# Patient Record
Sex: FEMALE | Race: BLACK OR AFRICAN AMERICAN | NOT HISPANIC OR LATINO | URBAN - METROPOLITAN AREA
[De-identification: names, ages, dates, MRNs, and addresses within clinical notes are randomized per-mention and may not be internally consistent; named-entity substitution may affect disease eponyms.]

---

## 2024-08-27 ENCOUNTER — APPOINTMENT (OUTPATIENT)
Dept: DERMATOLOGY | Facility: CLINIC | Age: 39
End: 2024-08-27
Payer: COMMERCIAL

## 2024-11-15 ENCOUNTER — APPOINTMENT (OUTPATIENT)
Dept: RADIOLOGY | Facility: HOSPITAL | Age: 39
End: 2024-11-15
Payer: COMMERCIAL

## 2024-11-15 ENCOUNTER — HOSPITAL ENCOUNTER (EMERGENCY)
Facility: HOSPITAL | Age: 39
Discharge: HOME | End: 2024-11-15
Payer: COMMERCIAL

## 2024-11-15 VITALS
OXYGEN SATURATION: 97 % | SYSTOLIC BLOOD PRESSURE: 138 MMHG | RESPIRATION RATE: 15 BRPM | DIASTOLIC BLOOD PRESSURE: 79 MMHG | HEART RATE: 83 BPM | WEIGHT: 180 LBS | BODY MASS INDEX: 29.99 KG/M2 | HEIGHT: 65 IN | TEMPERATURE: 97.6 F

## 2024-11-15 DIAGNOSIS — S92.354A NONDISPLACED FRACTURE OF FIFTH METATARSAL BONE, RIGHT FOOT, INITIAL ENCOUNTER FOR CLOSED FRACTURE: Primary | ICD-10-CM

## 2024-11-15 PROCEDURE — 99284 EMERGENCY DEPT VISIT MOD MDM: CPT | Performed by: NURSE PRACTITIONER

## 2024-11-15 PROCEDURE — 2500000001 HC RX 250 WO HCPCS SELF ADMINISTERED DRUGS (ALT 637 FOR MEDICARE OP): Mod: SE | Performed by: NURSE PRACTITIONER

## 2024-11-15 PROCEDURE — 73610 X-RAY EXAM OF ANKLE: CPT | Mod: RT

## 2024-11-15 PROCEDURE — 73630 X-RAY EXAM OF FOOT: CPT | Mod: RIGHT SIDE | Performed by: RADIOLOGY

## 2024-11-15 PROCEDURE — 99284 EMERGENCY DEPT VISIT MOD MDM: CPT | Mod: 25 | Performed by: NURSE PRACTITIONER

## 2024-11-15 PROCEDURE — 29505 APPLICATION LONG LEG SPLINT: CPT | Performed by: NURSE PRACTITIONER

## 2024-11-15 PROCEDURE — 73610 X-RAY EXAM OF ANKLE: CPT | Mod: RIGHT SIDE | Performed by: RADIOLOGY

## 2024-11-15 PROCEDURE — 73630 X-RAY EXAM OF FOOT: CPT | Mod: RT

## 2024-11-15 RX ORDER — IBUPROFEN 600 MG/1
600 TABLET ORAL ONCE
Status: COMPLETED | OUTPATIENT
Start: 2024-11-15 | End: 2024-11-15

## 2024-11-15 RX ORDER — IBUPROFEN 600 MG/1
600 TABLET ORAL EVERY 6 HOURS PRN
Qty: 15 TABLET | Refills: 0 | Status: SHIPPED | OUTPATIENT
Start: 2024-11-15 | End: 2024-11-22

## 2024-11-15 RX ORDER — ACETAMINOPHEN 325 MG/1
650 TABLET ORAL EVERY 6 HOURS PRN
Qty: 30 TABLET | Refills: 0 | Status: SHIPPED | OUTPATIENT
Start: 2024-11-15

## 2024-11-15 ASSESSMENT — COLUMBIA-SUICIDE SEVERITY RATING SCALE - C-SSRS
2. HAVE YOU ACTUALLY HAD ANY THOUGHTS OF KILLING YOURSELF?: NO
1. IN THE PAST MONTH, HAVE YOU WISHED YOU WERE DEAD OR WISHED YOU COULD GO TO SLEEP AND NOT WAKE UP?: NO
6. HAVE YOU EVER DONE ANYTHING, STARTED TO DO ANYTHING, OR PREPARED TO DO ANYTHING TO END YOUR LIFE?: NO

## 2024-11-15 ASSESSMENT — PAIN DESCRIPTION - PAIN TYPE: TYPE: ACUTE PAIN

## 2024-11-15 ASSESSMENT — LIFESTYLE VARIABLES
TOTAL SCORE: 0
HAVE PEOPLE ANNOYED YOU BY CRITICIZING YOUR DRINKING: NO
EVER FELT BAD OR GUILTY ABOUT YOUR DRINKING: NO
EVER HAD A DRINK FIRST THING IN THE MORNING TO STEADY YOUR NERVES TO GET RID OF A HANGOVER: NO
HAVE YOU EVER FELT YOU SHOULD CUT DOWN ON YOUR DRINKING: NO

## 2024-11-15 ASSESSMENT — PAIN DESCRIPTION - ORIENTATION: ORIENTATION: RIGHT

## 2024-11-15 ASSESSMENT — PAIN - FUNCTIONAL ASSESSMENT: PAIN_FUNCTIONAL_ASSESSMENT: 0-10

## 2024-11-15 ASSESSMENT — PAIN SCALES - GENERAL
PAINLEVEL_OUTOF10: 5 - MODERATE PAIN
PAINLEVEL_OUTOF10: 0 - NO PAIN

## 2024-11-15 ASSESSMENT — PAIN DESCRIPTION - LOCATION: LOCATION: ANKLE

## 2024-11-15 ASSESSMENT — PAIN DESCRIPTION - DESCRIPTORS: DESCRIPTORS: DISCOMFORT

## 2024-11-15 NOTE — ED PROCEDURE NOTE
Procedure  Splint Application    Performed by: EUGENE Finley  Authorized by: EUGENE Finley    Consent:     Consent obtained:  Verbal    Consent given by:  Patient    Risks, benefits, and alternatives were discussed: yes      Risks discussed:  Pain    Alternatives discussed:  No treatment  Universal protocol:     Procedure explained and questions answered to patient or proxy's satisfaction: yes      Relevant documents present and verified: yes      Test results available: yes      Imaging studies available: yes      Site/side marked: yes      Immediately prior to procedure a time out was called: yes      Patient identity confirmed:  Verbally with patient and hospital-assigned identification number  Pre-procedure details:     Distal neurologic exam:  Normal    Distal perfusion: distal pulses strong and brisk capillary refill    Procedure details:     Location:  Foot    Foot location:  R foot    Cast type:  Long leg    Splint type:  Long leg    Supplies:  Plaster    Attestation: Splint applied and adjusted personally by me    Post-procedure details:     Distal neurologic exam:  Normal    Distal perfusion: distal pulses strong and brisk capillary refill      Procedure completion:  Tolerated well, no immediate complications             EUGENE Finley  11/15/24 1048

## 2024-11-15 NOTE — ED PROVIDER NOTES
Emergency Department Encounter  [facility] EMERGENCY MEDICINE    Patient: Colette Mena  MRN: [id number]  : 1985  Date of Evaluation: 11/15/2024  ED Provider: EUGENE Finley      Chief Complaint       Chief Complaint   Patient presents with    Ankle Pain        Limitations to History: none  Historian: patient  Records reviewed: EMR inpatient and outpatient notes, Care Everywhere    This is a 39-year-old female without any significant PMH who presents to the emergency room with right ankle and foot pain.  Patient states that she slipped and fell yesterday.  Patient states that she was walking outside on the concrete when this happened.  Denies any head injury or LOC.  Denies any other injuries.  Patient states that she has right foot and right ankle pain.  Patient describes her pain as a pressure, constant pain rating it a 10 out of 10.  Patient has been able to ambulate after the fall.  Denies taking any over-the-counter medications prior to arrival.    PMH: Denies  PSH: Denies  Allergies: NKDA  Social HX: + smoker, denies alcohol or drug use.  Family HX: No family history pertinent to current presenting problem  Medications: Reviewed per EMR    ROS:     Review of Systems   Musculoskeletal:         + Foot and ankle pain     14 systems reviewed and otherwise acutely negative except as in the [place].        Past History     Past Medical History:   Diagnosis Date    Other conditions influencing health status     H/O pregnancy    Personal history of other endocrine, nutritional and metabolic disease     History of obesity    Personal history of other mental and behavioral disorders     History of depression    Personal history of other specified conditions     History of nausea    Personal history of other specified conditions     History of vomiting    Type A blood, Rh positive     Blood type A+     Past Surgical History:   Procedure Laterality Date    TUBAL LIGATION  2014     Tubal Ligation         Medications/Allergies     Previous Medications    No medications on file     No Known Allergies     Physical Exam       ED Triage Vitals [11/15/24 0827]   Temperature Heart Rate Respirations BP   36.4 °C (97.6 °F) 90 16 144/89      Pulse Ox Temp Source Heart Rate Source Patient Position   98 % Oral Monitor Sitting      BP Location FiO2 (%)     Left arm --       Physical Exam:    Appearance: Alert, oriented , cooperative,  in no acute distress.     Skin: Intact,  dry skin, no lesions, rash, petechiae or purpura.     Eyes: PERRLA, EOMs intact.    ENT: Hearing grossly intact. External auditory canals patent, tympanic membranes intact with visible landmarks. Nares patent, mucus membranes moist. Dentition without lesions. Pharynx clear, uvula midline.     Neck: Supple, without meningismus.     Pulmonary: Clear bilaterally with good chest wall excursion. No rales, rhonchi or wheezing. No accessory muscle use or stridor.    Cardiac: Normal S1, S2 without murmur, rub, gallop or extrasystole. No JVD, Carotids without bruits.    Abdomen: Soft, nontender, active bowel sounds.  No palpable organomegaly.  No rebound or guarding.  No CVA tenderness.    Musculoskeletal: Limited ROM of the right ankle. Mild swelling to the lateral right foot and ankle. Pulses intact. Tenderness to the right lateral ankle and foot.    Neurological:  Normal sensation, no weakness, no focal findings identified.    Psychiatric: Appropriate mood and affect.       Diagnostics   Labs:  No results found for this or any previous visit (from the past 24 hours).   Radiographs:  XR ankle right 3+ views   Final Result   Nondisplaced fracture through the base of the fifth metatarsal.   Signed by Jorge Herzog MD      XR foot right 3+ views   Final Result   Nondisplaced fracture through the base of the fifth metatarsal.   Signed by Jorge Herzog MD                Assessment   In brief, Colette Mena is a 39 y.o. female who presented  "to the emergency department with right foot pain.          ED Course/[facility]     Diagnoses as of 11/15/24 1045   Nondisplaced fracture of fifth metatarsal bone, right foot, initial encounter for closed fracture      Visit Vitals  /89 (BP Location: Left arm, Patient Position: Sitting)   Pulse 90   Temp 36.4 °C (97.6 °F) (Oral)   Resp 16   Ht 1.651 m (5' 5\")   Wt 81.6 kg (180 lb)   SpO2 98%   BMI 29.95 kg/m²   BSA 1.93 m²       Medications   ibuprofen tablet 600 mg (600 mg oral Given 11/15/24 0851)       Patient remained stable while in the emergency department. Previous outpatient and ED records were reviewed. Outside records were reviewed.  Differentials include fracture, sprain, strain.  X-ray of the right foot and ankle was obtained.  Nondisplaced fracture through the base of the fifth metatarsal.  Posterior leg splint was applied.  Please see procedure note for complete details.  Patient received ibuprofen 600 mg p.o. once for pain.  Patient denies any chance of pregnancy and states that she is on Depo.  Patient was advised to follow-up with Ortho and was provided with crutches.  Patient was discharged home, advised to follow-up with orthopedics and return the emergency room with worsening symptoms.    Final Impression      1. Nondisplaced fracture of fifth metatarsal bone, right foot, initial encounter for closed fracture          DISPOSITION  Disposition: Discharged home    Comment: Please note this report has been produced using speech recognition software and may contain errors related to that system including errors in grammar, punctuation, and spelling, as well as words and phrases that may be inappropriate.  If there are any questions or concerns please feel free to contact the dictating provider for clarification.    JOHAN Finley-JOHAN Hood-SISSY  11/15/24 1046    "

## 2024-11-15 NOTE — LETTER
November 15, 2024    Patient: Colette Mena   YOB: 1985   Date of Visit: 11/15/2024       To Whom It May Concern:    Colette Mena was seen and treated in our emergency department on 11/15/2024. She  may return to work on 11/22/24  .    If you have any questions or concerns, please don't hesitate to call.              CC: No Recipients

## 2024-11-19 ENCOUNTER — APPOINTMENT (OUTPATIENT)
Dept: ORTHOPEDIC SURGERY | Facility: CLINIC | Age: 39
End: 2024-11-19
Payer: COMMERCIAL

## 2024-11-21 NOTE — PROGRESS NOTES
Subjective      Chief Complaint   Patient presents with    Right Foot - Pain        No surgery found     HPI  This 39 year old patient presents for evaluation of right foot and ankle pain. She states that she was walking outdoors on 11/14/24 and slipped and fell on the cement. She was evaluated in the emergency room at  [facility] the next day and had xrays which were positive for right 5th metatarsal fracture. She currently rates her right foot pain at _3/10 and states it is worse and aggravated by prolonged standing, walking, stair climbing. She has been taking OTC Tylenol alternating with OTC NSAIDS with no relief of pain.     CARDIOLOGY:   Negative for chest pain, shortness of breath.   RESPIRATORY:   Negative for chest pain, shortness of breath.   MUSCULOSKELETAL:   See HPI for details.   NEUROLOGY:   Negative for tingling, numbness, weakness.    Objective    There were no vitals filed for this visit.    Physical Exam  GENERAL:          General Appearance:  This is a pleasant patient with appropriate affect, in no acute distress.   DERMATOLOGY:          Skin: skin at the neck, upper and lower back, and trunk is intact. There is no evidence of skin rash, skin breakdown or ulceration, or atrophic skin change.   EXTREMITIES:          Vascular:  Right, left hands and feet are warm with good color and pulses. Right and left calf and thigh are nontender and nonswollen.   NEUROLOGICAL:          Orientation:  Patient is alert and oriented to person, place, time and situation. Right and left upper and lower extremity motor and sensory examinations are intact.      MUSCULOSKELETAL: Neck: Nontender. No pain with range of motion.  Right foot: There is diffuse tenderness over the base of the right fifth metatarsal.  No pain or limitation with range of motion of the right ankle.  Nontender at the right ankle over the medial or lateral malleolus.  Caldwell's is negative and equal bilaterally.  Compartments soft.   Nontender in the right calf.  Neurovascular is intact to light touch.  Distal pulses are palpable.    XR ankle right 3+ views    Result Date: 11/15/2024  STUDY: Right foot and ankle radiographs; 11/15/2024 9:10 AM INDICATION: Right foot and ankle pain. COMPARISON: None. ACCESSION NUMBER(S): [id number], [id number] ORDERING CLINICIAN: Loretta Culp TECHNIQUE:  Three views of the right foot and three views of the right ankle. FINDINGS:  Right Foot:  Nondisplaced fracture through the base of the fifth metatarsal..  The alignment is anatomic.  Mild lateral soft tissue swelling.. Right Ankle:  There is no displaced fracture.  The alignment is anatomic.  No soft tissue abnormality is seen.    Nondisplaced fracture through the base of the fifth metatarsal. Signed by Jorge Herzog MD    XR foot right 3+ views    Result Date: 11/15/2024  STUDY: Right foot and ankle radiographs; 11/15/2024 9:10 AM INDICATION: Right foot and ankle pain. COMPARISON: None. ACCESSION NUMBER(S): [id number], [id number] ORDERING CLINICIAN: Loretta Culp TECHNIQUE:  Three views of the right foot and three views of the right ankle. FINDINGS:  Right Foot:  Nondisplaced fracture through the base of the fifth metatarsal..  The alignment is anatomic.  Mild lateral soft tissue swelling.. Right Ankle:  There is no displaced fracture.  The alignment is anatomic.  No soft tissue abnormality is seen.    Nondisplaced fracture through the base of the fifth metatarsal. Signed by Jorge Herzog MD       Colette was seen today for pain.  Diagnoses and all orders for this visit:  Right foot pain (Primary)  -     Walking boot  Fracture of base of fifth metatarsal bone of right foot at metaphyseal-diaphyseal junction with routine healing, subsequent encounter  -     Walking boot  Nondisplaced fracture of fifth metatarsal bone, right foot, initial encounter for closed fracture  -     Referral to Orthopaedic Surgery  -     Walking boot   Options are discussed  with the patient in detail. An initial attempted at nonoperative treatment with application of a right lower extremity walking boot with indications, alternatives, potential risks, benefits, unforeseen risks, the rehab involved and the fact that no guarantee can be made were all discussed with the patient in detail. The patient understands, accepts and wishes to proceed. I agree. This is done in the office today.  The patient is instructed regarding activity modification and risk for further injury with falling or trauma, ice, physician directed at home gentle strengthening and ROM exercises, and the appropriate use of Tylenol as needed for pain with its potential adverse reactions and side effects. The patient understands. ,  I estimate that this patient is able to return to work on January 6 and she is given it a note regarding this in office today.  Return in 4 weeks for ricardo-ray or sooner as needed.  Please note that this report has been produced using speech recognition software.  It may contain errors related to grammar, punctuation or spelling.  Electronically signed, but not reviewed.    Kayleigh Tadeo PA-C

## 2024-11-22 ENCOUNTER — TREATMENT (OUTPATIENT)
Dept: PHYSICAL THERAPY | Facility: CLINIC | Age: 39
End: 2024-11-22
Payer: COMMERCIAL

## 2024-11-22 ENCOUNTER — OFFICE VISIT (OUTPATIENT)
Dept: ORTHOPEDIC SURGERY | Facility: CLINIC | Age: 39
End: 2024-11-22
Payer: COMMERCIAL

## 2024-11-22 VITALS — HEIGHT: 63 IN | WEIGHT: 180 LBS | BODY MASS INDEX: 31.89 KG/M2

## 2024-11-22 DIAGNOSIS — S99.191D: ICD-10-CM

## 2024-11-22 DIAGNOSIS — M79.671 RIGHT FOOT PAIN: Primary | ICD-10-CM

## 2024-11-22 DIAGNOSIS — S92.354A NONDISPLACED FRACTURE OF FIFTH METATARSAL BONE, RIGHT FOOT, INITIAL ENCOUNTER FOR CLOSED FRACTURE: ICD-10-CM

## 2024-11-22 PROCEDURE — 28470 CLTX METATARSAL FX WO MNP EA: CPT | Performed by: PHYSICIAN ASSISTANT

## 2024-11-22 PROCEDURE — 97760 ORTHOTIC MGMT&TRAING 1ST ENC: CPT | Mod: GP,CQ

## 2024-11-22 ASSESSMENT — ENCOUNTER SYMPTOMS
OCCASIONAL FEELINGS OF UNSTEADINESS: 0
DEPRESSION: 0
LOSS OF SENSATION IN FEET: 0

## 2024-11-22 ASSESSMENT — LIFESTYLE VARIABLES
HOW OFTEN DO YOU HAVE A DRINK CONTAINING ALCOHOL: NEVER
AUDIT TOTAL SCORE: 0
HOW OFTEN DURING THE LAST YEAR HAVE YOU BEEN UNABLE TO REMEMBER WHAT HAPPENED THE NIGHT BEFORE BECAUSE YOU HAD BEEN DRINKING: NEVER
HAS A RELATIVE, FRIEND, DOCTOR, OR ANOTHER HEALTH PROFESSIONAL EXPRESSED CONCERN ABOUT YOUR DRINKING OR SUGGESTED YOU CUT DOWN: NO
HOW OFTEN DURING THE LAST YEAR HAVE YOU HAD A FEELING OF GUILT OR REMORSE AFTER DRINKING: NEVER
HOW MANY STANDARD DRINKS CONTAINING ALCOHOL DO YOU HAVE ON A TYPICAL DAY: PATIENT DOES NOT DRINK
HOW OFTEN DURING THE LAST YEAR HAVE YOU NEEDED AN ALCOHOLIC DRINK FIRST THING IN THE MORNING TO GET YOURSELF GOING AFTER A NIGHT OF HEAVY DRINKING: NEVER
SKIP TO QUESTIONS 9-10: 1
HOW OFTEN DURING THE LAST YEAR HAVE YOU FOUND THAT YOU WERE NOT ABLE TO STOP DRINKING ONCE YOU HAD STARTED: NEVER
AUDIT-C TOTAL SCORE: 0
HAVE YOU OR SOMEONE ELSE BEEN INJURED AS A RESULT OF YOUR DRINKING: NO
HOW OFTEN DURING THE LAST YEAR HAVE YOU FAILED TO DO WHAT WAS NORMALLY EXPECTED FROM YOU BECAUSE OF DRINKING: NEVER
HOW OFTEN DO YOU HAVE SIX OR MORE DRINKS ON ONE OCCASION: NEVER

## 2024-11-22 ASSESSMENT — PATIENT HEALTH QUESTIONNAIRE - PHQ9
2. FEELING DOWN, DEPRESSED OR HOPELESS: NOT AT ALL
1. LITTLE INTEREST OR PLEASURE IN DOING THINGS: NOT AT ALL
SUM OF ALL RESPONSES TO PHQ9 QUESTIONS 1 AND 2: 0

## 2024-11-22 ASSESSMENT — COLUMBIA-SUICIDE SEVERITY RATING SCALE - C-SSRS
2. HAVE YOU ACTUALLY HAD ANY THOUGHTS OF KILLING YOURSELF?: NO
6. HAVE YOU EVER DONE ANYTHING, STARTED TO DO ANYTHING, OR PREPARED TO DO ANYTHING TO END YOUR LIFE?: NO
1. IN THE PAST MONTH, HAVE YOU WISHED YOU WERE DEAD OR WISHED YOU COULD GO TO SLEEP AND NOT WAKE UP?: NO

## 2024-11-22 ASSESSMENT — PAIN - FUNCTIONAL ASSESSMENT: PAIN_FUNCTIONAL_ASSESSMENT: NO/DENIES PAIN

## 2024-11-22 ASSESSMENT — PAIN SCALES - GENERAL: PAINLEVEL_OUTOF10: 2

## 2024-11-22 NOTE — PROGRESS NOTES
Physical Therapy                 Therapy Communication Note    Patient Name: Colette Mena  MRN: [id number]  Department: [address]  Room: Room/bed info not found  Today's Date: 11/22/2024     Discipline: Physical Therapy    Missed Visit Reason:      Missed Time:     Comment: Pt arrived with an order from Dr. Baptiste for a walking boot for a fifth metatarsal fx. Pt was fit with the boot and was shown how to don and doff  the boot. Pt reported it was a good fit and had no further questions.

## 2024-11-22 NOTE — LETTER
November 22, 2024     Patient: Colette Mena   YOB: 1985   Date of Visit: 11/22/2024       To Whom It May Concern:    It is my medical opinion that Colette Mena may return to work on 1-6-2025 .    If you have any questions or concerns, please don't hesitate to call.         Sincerely,        Kayleigh Tadeo PA-C    CC: No Recipients

## 2024-11-22 NOTE — PATIENT INSTRUCTIONS
Thank you for coming to see us today!     Continue to use tylenol for pain control.   Rest, ice and elevate and wear walking boot  You may bear weight as your pain allows    Follow up  in 4 weeks or sooner as needed      Please fax return to work note, and get [facility] [employer] [facility] paperwork to [phone]

## 2024-12-20 ENCOUNTER — HOSPITAL ENCOUNTER (OUTPATIENT)
Dept: RADIOLOGY | Facility: CLINIC | Age: 39
Discharge: HOME | End: 2024-12-20
Payer: COMMERCIAL

## 2024-12-20 ENCOUNTER — OFFICE VISIT (OUTPATIENT)
Dept: ORTHOPEDIC SURGERY | Facility: CLINIC | Age: 39
End: 2024-12-20
Payer: COMMERCIAL

## 2024-12-20 DIAGNOSIS — S92.354A NONDISPLACED FRACTURE OF FIFTH METATARSAL BONE, RIGHT FOOT, INITIAL ENCOUNTER FOR CLOSED FRACTURE: ICD-10-CM

## 2024-12-20 DIAGNOSIS — T14.8XXA FX: ICD-10-CM

## 2024-12-20 DIAGNOSIS — M79.671 RIGHT FOOT PAIN: Primary | ICD-10-CM

## 2024-12-20 DIAGNOSIS — S99.191D: ICD-10-CM

## 2024-12-20 PROCEDURE — 99211 OFF/OP EST MAY X REQ PHY/QHP: CPT | Performed by: PHYSICIAN ASSISTANT

## 2024-12-20 PROCEDURE — 73630 X-RAY EXAM OF FOOT: CPT | Mod: RT

## 2024-12-20 NOTE — PROGRESS NOTES
Subjective      No chief complaint on file.       No surgery found     HPI  This 39 year old patient presents for re-evaluation of right foot and ankle pain. She states that she was walking outdoors on 11/14/24 and slipped and fell on the cement. She was evaluated in the emergency room at  [facility] the next day and had xrays which were positive for right 5th metatarsal fracture. I previously evaluated her for a right foot 5th metatarsal fracture and treated her with walking boot and activity modification. She currently rates her right foot pain at 3/10 and states it is worse and aggravated by prolonged standing, walking, stair climbing. She is seen today wearing a walking boot. She has been taking OTC Tylenol alternating with OTC NSAIDS with no relief of pain.     CARDIOLOGY:   Negative for chest pain, shortness of breath.   RESPIRATORY:   Negative for chest pain, shortness of breath.   MUSCULOSKELETAL:   See HPI for details.   NEUROLOGY:   Negative for tingling, numbness, weakness.    Objective    There were no vitals filed for this visit.    Physical Exam  GENERAL:          General Appearance:  This is a pleasant patient with appropriate affect, in no acute distress.   DERMATOLOGY:          Skin: skin at the neck, upper and lower back, and trunk is intact. There is no evidence of skin rash, skin breakdown or ulceration, or atrophic skin change.   EXTREMITIES:          Vascular:  Right, left hands and feet are warm with good color and pulses. Right and left calf and thigh are nontender and nonswollen.   NEUROLOGICAL:          Orientation:  Patient is alert and oriented to person, place, time and situation. Right and left upper and lower extremity motor and sensory examinations are intact.      MUSCULOSKELETAL: Neck: Nontender. No pain with range of motion.  Right foot: There is no tenderness over the base of the right fifth metatarsal.  No pain or limitation with range of motion of the right ankle.   Nontender at the right ankle over the medial or lateral malleolus.  Caldwell's is negative and equal bilaterally.  Compartments soft.  Nontender in the right calf.  Neurovascular is intact to light touch.  Distal pulses are palpable.    XR ankle right 3+ views    Result Date: 11/15/2024  STUDY: Right foot and ankle radiographs; 11/15/2024 9:10 AM INDICATION: Right foot and ankle pain. COMPARISON: None. ACCESSION NUMBER(S): [id number], [id number] ORDERING CLINICIAN: Loretta Culp TECHNIQUE:  Three views of the right foot and three views of the right ankle. FINDINGS:  Right Foot:  Nondisplaced fracture through the base of the fifth metatarsal..  The alignment is anatomic.  Mild lateral soft tissue swelling.. Right Ankle:  There is no displaced fracture.  The alignment is anatomic.  No soft tissue abnormality is seen.    Nondisplaced fracture through the base of the fifth metatarsal. Signed by Jorge Herzog MD    XR foot right 3+ views    Result Date: 11/15/2024  STUDY: Right foot and ankle radiographs; 11/15/2024 9:10 AM INDICATION: Right foot and ankle pain. COMPARISON: None. ACCESSION NUMBER(S): [id number], [id number] ORDERING CLINICIAN: Loretta Culp TECHNIQUE:  Three views of the right foot and three views of the right ankle. FINDINGS:  Right Foot:  Nondisplaced fracture through the base of the fifth metatarsal..  The alignment is anatomic.  Mild lateral soft tissue swelling.. Right Ankle:  There is no displaced fracture.  The alignment is anatomic.  No soft tissue abnormality is seen.    Nondisplaced fracture through the base of the fifth metatarsal. Signed by Jorge Herzog MD       Diagnoses and all orders for this visit:  Right foot pain (Primary)  Nondisplaced fracture of fifth metatarsal bone, right foot, initial encounter for closed fracture  Fracture of base of fifth metatarsal bone of right foot at metaphyseal-diaphyseal junction with routine healing, subsequent encounter    Options are  discussed with the patient in detail.  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 The patient understands. ,  I estimate that this patient is able to return to work on January 6 and she is given it a note regarding this in office today.  Return in 6 weeks for ricardo-ray or sooner as needed.  Please note that this report has been produced using speech recognition software.  It may contain errors related to grammar, punctuation or spelling.  Electronically signed, but not reviewed.    Kayleigh Tadeo PA-C

## 2024-12-20 NOTE — PATIENT INSTRUCTIONS
Thank you for coming to see us today!     Continue to use tylenol for pain control.   Rest, ice and elevate and wear walking boot out   Wear high top ankle boots for support  You may bear weight as your pain allows    Follow up  after 2- or sooner as needed

## 2025-01-02 ENCOUNTER — TELEPHONE (OUTPATIENT)
Dept: ORTHOPEDIC SURGERY | Facility: CLINIC | Age: 40
End: 2025-01-02
Payer: COMMERCIAL

## 2025-01-02 NOTE — TELEPHONE ENCOUNTER
Spoke to patient and made appt for 1/25/25 ;due to continued pain.  Also faxed work note to patient employer

## 2025-01-02 NOTE — TELEPHONE ENCOUNTER
PATIENT HAS OFF OF WORK UNTIL JANUARY 6, 2025 BUT FEELS THAT HER FOOT IS STILL HURTING AND HAS TO DO A LOT OF WALKING AT WORK AND IT WILL BE HARD TO GO BACK YET.

## 2025-01-02 NOTE — TELEPHONE ENCOUNTER
Karyna, can we extend work note until  Jan 20, and have patient come in and see me for re-eval sooner.? Thank you.

## 2025-01-14 NOTE — PROGRESS NOTES
Subjective      Chief Complaint   Patient presents with    Right Foot - Pain, Follow-up        No surgery found     HPI  This 39 year old patient presents for re-evaluation of right foot and ankle pain. She states that she was walking outdoors on 11/14/24 and slipped and fell on the cement. She was evaluated in the emergency room at  [facility] the next day and had xrays which were positive for right 5th metatarsal fracture. I previously evaluated her for a right foot 5th metatarsal fracture and treated her with walking boot and activity modification. She currently rates her right foot pain at 3/10 and states it is worse and aggravated by prolonged standing, walking, stair climbing. She is seen today wearing a walking boot. She has been taking OTC Tylenol alternating with OTC NSAIDS with no relief of pain. Can transition out of walking boot, still in discomfort when walking.     CARDIOLOGY:   Negative for chest pain, shortness of breath.   RESPIRATORY:   Negative for chest pain, shortness of breath.   MUSCULOSKELETAL:   See HPI for details.   NEUROLOGY:   Negative for tingling, numbness, weakness.    Objective    There were no vitals filed for this visit.    Physical Exam  GENERAL:          General Appearance:  This is a pleasant patient with appropriate affect, in no acute distress.   DERMATOLOGY:          Skin: skin at the neck, upper and lower back, and trunk is intact. There is no evidence of skin rash, skin breakdown or ulceration, or atrophic skin change.   EXTREMITIES:          Vascular:  Right, left hands and feet are warm with good color and pulses. Right and left calf and thigh are nontender and nonswollen.   NEUROLOGICAL:          Orientation:  Patient is alert and oriented to person, place, time and situation. Right and left upper and lower extremity motor and sensory examinations are intact.      MUSCULOSKELETAL: Neck: Nontender. No pain with range of motion.  Right foot: There is no tenderness  over the base of the right fifth metatarsal.  No pain or limitation with range of motion of the right ankle.  Nontender at the right ankle over the medial or lateral malleolus.  Caldwell's is negative and equal bilaterally.  Compartments soft.  Nontender in the right calf.  Neurovascular is intact to light touch.  Distal pulses are palpable.    XR ankle right 3+ views    Result Date: 11/15/2024  STUDY: Right foot and ankle radiographs; 11/15/2024 9:10 AM INDICATION: Right foot and ankle pain. COMPARISON: None. ACCESSION NUMBER(S): [id number], [id number] ORDERING CLINICIAN: Loretta Culp TECHNIQUE:  Three views of the right foot and three views of the right ankle. FINDINGS:  Right Foot:  Nondisplaced fracture through the base of the fifth metatarsal..  The alignment is anatomic.  Mild lateral soft tissue swelling.. Right Ankle:  There is no displaced fracture.  The alignment is anatomic.  No soft tissue abnormality is seen.    Nondisplaced fracture through the base of the fifth metatarsal. Signed by Jorge Herzog MD    XR foot right 3+ views    Result Date: 11/15/2024  STUDY: Right foot and ankle radiographs; 11/15/2024 9:10 AM INDICATION: Right foot and ankle pain. COMPARISON: None. ACCESSION NUMBER(S): [id number], [id number] ORDERING CLINICIAN: Loretta Culp TECHNIQUE:  Three views of the right foot and three views of the right ankle. FINDINGS:  Right Foot:  Nondisplaced fracture through the base of the fifth metatarsal..  The alignment is anatomic.  Mild lateral soft tissue swelling.. Right Ankle:  There is no displaced fracture.  The alignment is anatomic.  No soft tissue abnormality is seen.    Nondisplaced fracture through the base of the fifth metatarsal. Signed by Jorge Herzog MD       Colette was seen today for pain and follow-up.  Diagnoses and all orders for this visit:  Right foot pain (Primary)  Nondisplaced fracture of fifth metatarsal bone, right foot, initial encounter for closed  fracture  Fracture of base of fifth metatarsal bone of right foot at metaphyseal-diaphyseal junction with routine healing, subsequent encounter    Options are discussed with the patient in detail.  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 The patient understands. ,  I estimate that this patient is able to return to work on 2- and she is given it a note regarding this in office today.  Return in 4 weeks for ricardo-ray or sooner as needed.  Please note that this report has been produced using speech recognition software.  It may contain errors related to grammar, punctuation or spelling.  Electronically signed, but not reviewed.    Kayleigh Tadeo PA-C

## 2025-01-15 ENCOUNTER — OFFICE VISIT (OUTPATIENT)
Dept: ORTHOPEDIC SURGERY | Facility: CLINIC | Age: 40
End: 2025-01-15
Payer: COMMERCIAL

## 2025-01-15 VITALS — BODY MASS INDEX: 31.89 KG/M2 | WEIGHT: 180 LBS | HEIGHT: 63 IN

## 2025-01-15 DIAGNOSIS — S92.354A NONDISPLACED FRACTURE OF FIFTH METATARSAL BONE, RIGHT FOOT, INITIAL ENCOUNTER FOR CLOSED FRACTURE: ICD-10-CM

## 2025-01-15 DIAGNOSIS — M79.671 RIGHT FOOT PAIN: Primary | ICD-10-CM

## 2025-01-15 DIAGNOSIS — S99.191D: ICD-10-CM

## 2025-01-15 PROCEDURE — 99211 OFF/OP EST MAY X REQ PHY/QHP: CPT | Performed by: PHYSICIAN ASSISTANT

## 2025-01-15 ASSESSMENT — PAIN - FUNCTIONAL ASSESSMENT: PAIN_FUNCTIONAL_ASSESSMENT: NO/DENIES PAIN

## 2025-01-15 ASSESSMENT — PAIN SCALES - GENERAL: PAINLEVEL_OUTOF10: 0-NO PAIN

## 2025-01-15 NOTE — PATIENT INSTRUCTIONS
Thank you for coming to see us today!     Continue to use tylenol for pain control.   Rest, ice and elevate   Wear high top ankle boots for support  You may bear weight as your pain allows    Follow up on 2/19/2025

## 2025-02-11 NOTE — PROGRESS NOTES
Subjective      Chief Complaint   Patient presents with    Right Foot - Follow-up, Pain        No surgery found     HPI  This 39 year old patient presents for re-evaluation of right foot and ankle pain. She states that she was walking outdoors on 11/14/24 and slipped and fell on the cement. She was evaluated in the emergency room at  [facility] the next day and had xrays which were positive for right 5th metatarsal fracture. I previously evaluated her for a right foot 5th metatarsal fracture and treated her with walking boot and activity modification. She currently rates her right foot pain at 3/10 and states it is worse and aggravated by prolonged standing, walking, stair climbing. She is seen today wearing a shoe for support. She has been taking OTC Tylenol alternating with OTC NSAIDS with some relief of pain.     CARDIOLOGY:   Negative for chest pain, shortness of breath.   RESPIRATORY:   Negative for chest pain, shortness of breath.   MUSCULOSKELETAL:   See HPI for details.   NEUROLOGY:   Negative for tingling, numbness, weakness.    Objective    There were no vitals filed for this visit.    Physical Exam  GENERAL:          General Appearance:  This is a pleasant patient with appropriate affect, in no acute distress.   DERMATOLOGY:          Skin: skin at the neck, upper and lower back, and trunk is intact. There is no evidence of skin rash, skin breakdown or ulceration, or atrophic skin change.   EXTREMITIES:          Vascular:  Right, left hands and feet are warm with good color and pulses. Right and left calf and thigh are nontender and nonswollen.   NEUROLOGICAL:          Orientation:  Patient is alert and oriented to person, place, time and situation. Right and left upper and lower extremity motor and sensory examinations are intact.      MUSCULOSKELETAL: Neck: Nontender. No pain with range of motion.  Right foot: There is no tenderness over the base of the right fifth metatarsal.  No pain or  limitation with range of motion of the right ankle.  Nontender at the right ankle over the medial or lateral malleolus.  Caldwell's is negative and equal bilaterally.  Compartments soft.  Nontender in the right calf.  Neurovascular is intact to light touch.  Distal pulses are palpable.    XR ankle right 3+ views    Result Date: 11/15/2024  STUDY: Right foot and ankle radiographs; 11/15/2024 9:10 AM INDICATION: Right foot and ankle pain. COMPARISON: None. ACCESSION NUMBER(S): [id number], [id number] ORDERING CLINICIAN: Loretta Culp TECHNIQUE:  Three views of the right foot and three views of the right ankle. FINDINGS:  Right Foot:  Nondisplaced fracture through the base of the fifth metatarsal..  The alignment is anatomic.  Mild lateral soft tissue swelling.. Right Ankle:  There is no displaced fracture.  The alignment is anatomic.  No soft tissue abnormality is seen.    Nondisplaced fracture through the base of the fifth metatarsal. Signed by Jorge Herzog MD    XR foot right 3+ views    Result Date: 11/15/2024  STUDY: Right foot and ankle radiographs; 11/15/2024 9:10 AM INDICATION: Right foot and ankle pain. COMPARISON: None. ACCESSION NUMBER(S): [id number], [id number] ORDERING CLINICIAN: Loretta Culp TECHNIQUE:  Three views of the right foot and three views of the right ankle. FINDINGS:  Right Foot:  Nondisplaced fracture through the base of the fifth metatarsal..  The alignment is anatomic.  Mild lateral soft tissue swelling.. Right Ankle:  There is no displaced fracture.  The alignment is anatomic.  No soft tissue abnormality is seen.    Nondisplaced fracture through the base of the fifth metatarsal. Signed by Jorge Herzog MD       Colette was seen today for follow-up and pain.  Diagnoses and all orders for this visit:  Right foot pain (Primary)  Fracture of base of fifth metatarsal bone of right foot at metaphyseal-diaphyseal junction with routine healing, subsequent encounter    Options are  discussed with the patient in detail.  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 The patient understands. ,  I estimate that this patient is able to return to work on 3- and she is given it a note regarding this in office today.  Return  as needed.  Please note that this report has been produced using speech recognition software.  It may contain errors related to grammar, punctuation or spelling.  Electronically signed, but not reviewed.    Kayleigh Tadeo PA-C

## 2025-02-13 ENCOUNTER — OFFICE VISIT (OUTPATIENT)
Dept: ORTHOPEDIC SURGERY | Facility: CLINIC | Age: 40
End: 2025-02-13
Payer: COMMERCIAL

## 2025-02-13 ENCOUNTER — HOSPITAL ENCOUNTER (OUTPATIENT)
Dept: RADIOLOGY | Facility: CLINIC | Age: 40
Discharge: HOME | End: 2025-02-13
Payer: COMMERCIAL

## 2025-02-13 VITALS — WEIGHT: 180 LBS | BODY MASS INDEX: 31.89 KG/M2 | HEIGHT: 63 IN

## 2025-02-13 DIAGNOSIS — S99.191D: ICD-10-CM

## 2025-02-13 DIAGNOSIS — R52 PAIN: ICD-10-CM

## 2025-02-13 DIAGNOSIS — M79.671 RIGHT FOOT PAIN: Primary | ICD-10-CM

## 2025-02-13 PROCEDURE — 73630 X-RAY EXAM OF FOOT: CPT | Mod: RT

## 2025-02-13 PROCEDURE — 99211 OFF/OP EST MAY X REQ PHY/QHP: CPT | Performed by: PHYSICIAN ASSISTANT

## 2025-02-13 ASSESSMENT — ENCOUNTER SYMPTOMS
LOSS OF SENSATION IN FEET: 0
DEPRESSION: 0
OCCASIONAL FEELINGS OF UNSTEADINESS: 0

## 2025-02-13 ASSESSMENT — PATIENT HEALTH QUESTIONNAIRE - PHQ9
SUM OF ALL RESPONSES TO PHQ9 QUESTIONS 1 AND 2: 0
2. FEELING DOWN, DEPRESSED OR HOPELESS: NOT AT ALL
1. LITTLE INTEREST OR PLEASURE IN DOING THINGS: NOT AT ALL

## 2025-02-13 ASSESSMENT — COLUMBIA-SUICIDE SEVERITY RATING SCALE - C-SSRS
1. IN THE PAST MONTH, HAVE YOU WISHED YOU WERE DEAD OR WISHED YOU COULD GO TO SLEEP AND NOT WAKE UP?: NO
2. HAVE YOU ACTUALLY HAD ANY THOUGHTS OF KILLING YOURSELF?: NO
6. HAVE YOU EVER DONE ANYTHING, STARTED TO DO ANYTHING, OR PREPARED TO DO ANYTHING TO END YOUR LIFE?: NO

## 2025-02-13 ASSESSMENT — PAIN - FUNCTIONAL ASSESSMENT: PAIN_FUNCTIONAL_ASSESSMENT: 0-10

## 2025-02-13 ASSESSMENT — PAIN SCALES - GENERAL
PAINLEVEL_OUTOF10: 3
PAINLEVEL_OUTOF10: 3

## 2025-02-13 ASSESSMENT — LIFESTYLE VARIABLES
HOW OFTEN DO YOU HAVE A DRINK CONTAINING ALCOHOL: NEVER
HOW OFTEN DO YOU HAVE SIX OR MORE DRINKS ON ONE OCCASION: NEVER

## 2025-02-13 NOTE — PATIENT INSTRUCTIONS
Thank you for coming to see us today!     Continue to use tylenol for pain control.   Rest, ice and elevate   Wear high top ankle boots for support  You may bear weight as your pain allows    Follow up as needed   
DISPLAY PLAN FREE TEXT

## 2025-02-13 NOTE — LETTER
February 13, 2025     Patient: Colette Mena   YOB: 1985   Date of Visit: 2/13/2025       To Whom It May Concern:    It is my medical opinion that Colette Mena may return to work on March 17th 2025 .    If you have any questions or concerns, please don't hesitate to call.         Sincerely,        Kayleigh Tadeo PA-C    CC: No Recipients

## 2025-02-25 ENCOUNTER — APPOINTMENT (OUTPATIENT)
Dept: ORTHOPEDIC SURGERY | Facility: CLINIC | Age: 40
End: 2025-02-25
Payer: COMMERCIAL

## 2025-04-08 ENCOUNTER — HOSPITAL ENCOUNTER (EMERGENCY)
Facility: HOSPITAL | Age: 40
Discharge: HOME | End: 2025-04-08
Attending: EMERGENCY MEDICINE
Payer: MEDICARE

## 2025-04-08 VITALS
HEART RATE: 76 BPM | BODY MASS INDEX: 31.89 KG/M2 | RESPIRATION RATE: 17 BRPM | SYSTOLIC BLOOD PRESSURE: 190 MMHG | HEIGHT: 63 IN | OXYGEN SATURATION: 97 % | TEMPERATURE: 96.8 F | WEIGHT: 180 LBS | DIASTOLIC BLOOD PRESSURE: 103 MMHG
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
## 2025-04-08 PROCEDURE — 2500000001 HC RX 250 WO HCPCS SELF ADMINISTERED DRUGS (ALT 637 FOR MEDICARE OP)

## 2025-04-08 PROCEDURE — 99284 EMERGENCY DEPT VISIT MOD MDM: CPT

## 2025-04-08 PROCEDURE — 99282 EMERGENCY DEPT VISIT SF MDM: CPT | Performed by: EMERGENCY MEDICINE

## 2025-04-08 PROCEDURE — 2500000005 HC RX 250 GENERAL PHARMACY W/O HCPCS

## 2025-04-08 RX ORDER — LIDOCAINE 560 MG/1
1 PATCH PERCUTANEOUS; TOPICAL; TRANSDERMAL ONCE
Status: DISCONTINUED | OUTPATIENT
Start: 2025-04-08 | End: 2025-04-08 | Stop reason: HOSPADM

## 2025-04-08 RX ORDER — METHOCARBAMOL 500 MG/1
500 TABLET, FILM COATED ORAL ONCE
Status: COMPLETED | OUTPATIENT
Start: 2025-04-08 | End: 2025-04-08

## 2025-04-08 RX ORDER — ACETAMINOPHEN 325 MG/1
650 TABLET ORAL EVERY 6 HOURS PRN
Qty: 30 TABLET | Refills: 0 | Status: SHIPPED | OUTPATIENT
Start: 2025-04-08 | End: 2025-04-13

## 2025-04-08 RX ORDER — IBUPROFEN 400 MG/1
400 TABLET, FILM COATED ORAL EVERY 6 HOURS PRN
Qty: 20 TABLET | Refills: 0 | Status: SHIPPED | OUTPATIENT
Start: 2025-04-08 | End: 2025-04-13

## 2025-04-08 RX ORDER — LIDOCAINE 560 MG/1
1 PATCH PERCUTANEOUS; TOPICAL; TRANSDERMAL DAILY
Qty: 5 PATCH | Refills: 0 | Status: SHIPPED | OUTPATIENT
Start: 2025-04-08

## 2025-04-08 RX ORDER — ACETAMINOPHEN 325 MG/1
650 TABLET ORAL ONCE
Status: COMPLETED | OUTPATIENT
Start: 2025-04-08 | End: 2025-04-08

## 2025-04-08 RX ADMIN — METHOCARBAMOL 500 MG: 500 TABLET ORAL at 10:06

## 2025-04-08 RX ADMIN — LIDOCAINE PAIN RELIEF 1 PATCH: 560 PATCH TOPICAL at 10:06

## 2025-04-08 RX ADMIN — ACETAMINOPHEN 650 MG: 325 TABLET ORAL at 10:06

## 2025-04-08 ASSESSMENT — PAIN SCALES - GENERAL: PAINLEVEL_OUTOF10: 7

## 2025-04-08 ASSESSMENT — PAIN - FUNCTIONAL ASSESSMENT: PAIN_FUNCTIONAL_ASSESSMENT: 0-10
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
## 2025-05-06 ENCOUNTER — HOSPITAL ENCOUNTER (EMERGENCY)
Facility: HOSPITAL | Age: 40
Discharge: HOME | End: 2025-05-06

## 2025-05-06 ENCOUNTER — APPOINTMENT (OUTPATIENT)
Dept: RADIOLOGY | Facility: HOSPITAL | Age: 40
End: 2025-05-06

## 2025-05-06 VITALS
WEIGHT: 185 LBS | BODY MASS INDEX: 32.78 KG/M2 | HEART RATE: 92 BPM | OXYGEN SATURATION: 97 % | HEIGHT: 63 IN | DIASTOLIC BLOOD PRESSURE: 98 MMHG | TEMPERATURE: 97.2 F | SYSTOLIC BLOOD PRESSURE: 149 MMHG | RESPIRATION RATE: 17 BRPM

## 2025-05-06 DIAGNOSIS — N20.0 KIDNEY STONE: Primary | ICD-10-CM

## 2025-05-06 LAB
ALBUMIN SERPL BCP-MCNC: 3.8 G/DL (ref 3.4–5)
ALP SERPL-CCNC: 83 U/L (ref 33–110)
ALT SERPL W P-5'-P-CCNC: 8 U/L (ref 7–45)
ANION GAP SERPL CALC-SCNC: 13 MMOL/L (ref 10–20)
APPEARANCE UR: ABNORMAL
AST SERPL W P-5'-P-CCNC: 12 U/L (ref 9–39)
BASOPHILS # BLD AUTO: 0.09 X10*3/UL (ref 0–0.1)
BASOPHILS NFR BLD AUTO: 0.7 %
BILIRUB SERPL-MCNC: 0.3 MG/DL (ref 0–1.2)
BILIRUB UR STRIP.AUTO-MCNC: NEGATIVE MG/DL
BUN SERPL-MCNC: 8 MG/DL (ref 6–23)
CALCIUM SERPL-MCNC: 9.3 MG/DL (ref 8.6–10.6)
CHLORIDE SERPL-SCNC: 105 MMOL/L (ref 98–107)
CO2 SERPL-SCNC: 24 MMOL/L (ref 21–32)
COLOR UR: YELLOW
CREAT SERPL-MCNC: 0.73 MG/DL (ref 0.5–1.05)
EGFRCR SERPLBLD CKD-EPI 2021: >90 ML/MIN/1.73M*2
EOSINOPHIL # BLD AUTO: 1.15 X10*3/UL (ref 0–0.7)
EOSINOPHIL NFR BLD AUTO: 9.4 %
ERYTHROCYTE [DISTWIDTH] IN BLOOD BY AUTOMATED COUNT: 13.2 % (ref 11.5–14.5)
GLUCOSE SERPL-MCNC: 85 MG/DL (ref 74–99)
GLUCOSE UR STRIP.AUTO-MCNC: NORMAL MG/DL
HCT VFR BLD AUTO: 41.1 % (ref 36–46)
HGB BLD-MCNC: 14.1 G/DL (ref 12–16)
HOLD SPECIMEN: 293
IMM GRANULOCYTES # BLD AUTO: 0.04 X10*3/UL (ref 0–0.7)
IMM GRANULOCYTES NFR BLD AUTO: 0.3 % (ref 0–0.9)
KETONES UR STRIP.AUTO-MCNC: NEGATIVE MG/DL
LEUKOCYTE ESTERASE UR QL STRIP.AUTO: NEGATIVE
LIPASE SERPL-CCNC: 30 U/L (ref 9–82)
LYMPHOCYTES # BLD AUTO: 4.02 X10*3/UL (ref 1.2–4.8)
LYMPHOCYTES NFR BLD AUTO: 32.8 %
MCH RBC QN AUTO: 30.5 PG (ref 26–34)
MCHC RBC AUTO-ENTMCNC: 34.3 G/DL (ref 32–36)
MCV RBC AUTO: 89 FL (ref 80–100)
MONOCYTES # BLD AUTO: 1 X10*3/UL (ref 0.1–1)
MONOCYTES NFR BLD AUTO: 8.1 %
MUCOUS THREADS #/AREA URNS AUTO: ABNORMAL /LPF
NEUTROPHILS # BLD AUTO: 5.97 X10*3/UL (ref 1.2–7.7)
NEUTROPHILS NFR BLD AUTO: 48.7 %
NITRITE UR QL STRIP.AUTO: NEGATIVE
NRBC BLD-RTO: 0 /100 WBCS (ref 0–0)
PH UR STRIP.AUTO: 6.5 [PH]
PLATELET # BLD AUTO: 333 X10*3/UL (ref 150–450)
POTASSIUM SERPL-SCNC: 4.4 MMOL/L (ref 3.5–5.3)
PREGNANCY TEST URINE, POC: NEGATIVE
PROT SERPL-MCNC: 6.8 G/DL (ref 6.4–8.2)
PROT UR STRIP.AUTO-MCNC: NEGATIVE MG/DL
RBC # BLD AUTO: 4.62 X10*6/UL (ref 4–5.2)
RBC # UR STRIP.AUTO: ABNORMAL MG/DL
RBC #/AREA URNS AUTO: >20 /HPF
SODIUM SERPL-SCNC: 138 MMOL/L (ref 136–145)
SP GR UR STRIP.AUTO: 1.02
SQUAMOUS #/AREA URNS AUTO: ABNORMAL /HPF
UROBILINOGEN UR STRIP.AUTO-MCNC: ABNORMAL MG/DL
WBC # BLD AUTO: 12.3 X10*3/UL (ref 4.4–11.3)
WBC #/AREA URNS AUTO: ABNORMAL /HPF
YEAST BUDDING #/AREA UR COMP ASSIST: PRESENT /HPF

## 2025-05-06 PROCEDURE — 74177 CT ABD & PELVIS W/CONTRAST: CPT

## 2025-05-06 PROCEDURE — 99285 EMERGENCY DEPT VISIT HI MDM: CPT | Mod: 25

## 2025-05-06 PROCEDURE — 36415 COLL VENOUS BLD VENIPUNCTURE: CPT | Performed by: PHYSICIAN ASSISTANT

## 2025-05-06 PROCEDURE — 96374 THER/PROPH/DIAG INJ IV PUSH: CPT

## 2025-05-06 PROCEDURE — 84075 ASSAY ALKALINE PHOSPHATASE: CPT | Performed by: PHYSICIAN ASSISTANT

## 2025-05-06 PROCEDURE — 2550000001 HC RX 255 CONTRASTS: Performed by: PHYSICIAN ASSISTANT

## 2025-05-06 PROCEDURE — 85025 COMPLETE CBC W/AUTO DIFF WBC: CPT | Performed by: PHYSICIAN ASSISTANT

## 2025-05-06 PROCEDURE — 81001 URINALYSIS AUTO W/SCOPE: CPT | Performed by: PHYSICIAN ASSISTANT

## 2025-05-06 PROCEDURE — 74177 CT ABD & PELVIS W/CONTRAST: CPT | Performed by: RADIOLOGY

## 2025-05-06 PROCEDURE — 2500000004 HC RX 250 GENERAL PHARMACY W/ HCPCS (ALT 636 FOR OP/ED): Mod: JZ | Performed by: PHYSICIAN ASSISTANT

## 2025-05-06 PROCEDURE — 83690 ASSAY OF LIPASE: CPT | Performed by: PHYSICIAN ASSISTANT

## 2025-05-06 PROCEDURE — 81025 URINE PREGNANCY TEST: CPT | Performed by: PHYSICIAN ASSISTANT

## 2025-05-06 RX ORDER — TAMSULOSIN HYDROCHLORIDE 0.4 MG/1
0.4 CAPSULE ORAL DAILY
Qty: 7 CAPSULE | Refills: 0 | Status: SHIPPED | OUTPATIENT
Start: 2025-05-06 | End: 2025-05-06

## 2025-05-06 RX ORDER — ORPHENADRINE CITRATE 30 MG/ML
60 INJECTION INTRAMUSCULAR; INTRAVENOUS ONCE
Status: COMPLETED | OUTPATIENT
Start: 2025-05-06 | End: 2025-05-06

## 2025-05-06 RX ORDER — TAMSULOSIN HYDROCHLORIDE 0.4 MG/1
0.4 CAPSULE ORAL DAILY
Qty: 7 CAPSULE | Refills: 0 | Status: SHIPPED | OUTPATIENT
Start: 2025-05-06 | End: 2025-05-13

## 2025-05-06 RX ORDER — CYCLOBENZAPRINE HCL 10 MG
10 TABLET ORAL 3 TIMES DAILY PRN
Qty: 30 TABLET | Refills: 0 | Status: SHIPPED | OUTPATIENT
Start: 2025-05-06 | End: 2025-05-06

## 2025-05-06 RX ORDER — IBUPROFEN 600 MG/1
600 TABLET ORAL EVERY 6 HOURS PRN
Qty: 28 TABLET | Refills: 0 | Status: SHIPPED | OUTPATIENT
Start: 2025-05-06 | End: 2025-05-06

## 2025-05-06 RX ORDER — CYCLOBENZAPRINE HCL 10 MG
10 TABLET ORAL 3 TIMES DAILY PRN
Qty: 30 TABLET | Refills: 0 | Status: SHIPPED | OUTPATIENT
Start: 2025-05-06 | End: 2025-05-16

## 2025-05-06 RX ORDER — IBUPROFEN 600 MG/1
600 TABLET ORAL EVERY 6 HOURS PRN
Qty: 28 TABLET | Refills: 0 | Status: SHIPPED | OUTPATIENT
Start: 2025-05-06 | End: 2025-05-13

## 2025-05-06 RX ADMIN — IOHEXOL 80 ML: 350 INJECTION, SOLUTION INTRAVENOUS at 19:25

## 2025-05-06 RX ADMIN — ORPHENADRINE CITRATE 60 MG: 60 INJECTION INTRAMUSCULAR; INTRAVENOUS at 15:37

## 2025-05-06 ASSESSMENT — PAIN SCALES - GENERAL: PAINLEVEL_OUTOF10: 10 - WORST POSSIBLE PAIN

## 2025-05-06 ASSESSMENT — PAIN - FUNCTIONAL ASSESSMENT: PAIN_FUNCTIONAL_ASSESSMENT: 0-10

## 2025-05-06 NOTE — ED PROVIDER NOTES
Emergency Department Encounter  [facility] EMERGENCY MEDICINE    Patient: Colette Mena  MRN: [id number]  : 1985  Date of Evaluation: 2025  ED Provider: Katarzyna Cisneros PA-C      Chief Complaint       Chief Complaint   Patient presents with    Back Pain     HPI    Colette Mena is a 40 y.o. female who presents to the emergency department presenting for left sided abdominal pain ongoing for the past few days. Reports she has had episodes of this type of pain for the past year - occurs to mid-left abdomen and radiates up to LUQ. Had a mammogram recently that was normal. Reports pain is constant once it onsets -unsure of what makes her pain better or worse.  Denies any associated chest pain, shortness of breath, fever or chills, headache or dizziness, nausea or vomiting, diarrhea, dysuria or hematuria, changes in urinary frequency or urgency, blood in the stool, abnormal vaginal bleeding or discharge, rash or cough.  Reports that she does smoke cigarettes, reports rare occasional alcohol, denies any marijuana and/or illicit drug use.    ROS:     Review of Systems  14 systems reviewed and otherwise acutely negative except as in the HPI.    Past History   Medical History[1]  Surgical History[2]  Social History[3]    Medications/Allergies     Previous Medications    LIDOCAINE 4 % PATCH    Place 1 patch over 12 hours on the skin once daily. Remove & discard patch within 12 hours or as directed by MD.     Allergies[4]     Physical Exam       ED Triage Vitals [25 1455]   Temperature Heart Rate Respirations BP   36.2 °C (97.2 °F) 92 17 (!) 149/98      Pulse Ox Temp Source Heart Rate Source Patient Position   97 % Temporal Monitor Sitting      BP Location FiO2 (%)     Left arm --         Physical Exam    Physical Exam:    VS: As documented in the triage note and EMR flowsheet from this visit were reviewed.    Appearance: Alert, oriented, cooperative, in no acute distress. Well  nourished & well hydrated.    Skin: Atraumatic. Warm, intact and dry. No lesions, rash, or petechiae.    Neck: Supple, without lymphadenopathy. No midline or paraspinal tenderness    Pulmonary: Clear bilaterally with good chest wall excursion. No rales, rhonchi or wheezing. No accessory muscle use or stridor. Nonlabored breathing, no supplemental oxygen.    Cardiac: Normal S1, S2 without murmur, rub, gallop or extrasystole. No chest tenderness.    Abdomen: Soft, nontender, active bowel sounds. Negative Castillo's sign. No point tenderness at McBurney's point. No palpable organomegaly. No rebound or involuntary guarding. No CVA tenderness.    Musculoskeletal: Spontaneously moving all extremities without limitation. Extremities warm and well-perfused, capillary refill less than 2 seconds. Pulses full and equal. No lower extremity erythema, edema or increased warmth.     Neurological:  Cranial nerves II through XII are grossly intact.    Psychiatric: Appropriate mood and affect. Kempt appearance.      Diagnostics   Labs:  Labs Reviewed   CBC WITH AUTO DIFFERENTIAL - Abnormal       Result Value    WBC 12.3 (*)     nRBC 0.0      RBC 4.62      Hemoglobin 14.1      Hematocrit 41.1      MCV 89      MCH 30.5      MCHC 34.3      RDW 13.2      Platelets 333      Neutrophils % 48.7      Immature Granulocytes %, Automated 0.3      Lymphocytes % 32.8      Monocytes % 8.1      Eosinophils % 9.4      Basophils % 0.7      Neutrophils Absolute 5.97      Immature Granulocytes Absolute, Automated 0.04      Lymphocytes Absolute 4.02      Monocytes Absolute 1.00      Eosinophils Absolute 1.15 (*)     Basophils Absolute 0.09     URINALYSIS WITH REFLEX CULTURE AND MICROSCOPIC - Abnormal    Color, Urine Yellow      Appearance, Urine Turbid (*)     Specific Gravity, Urine 1.025      pH, Urine 6.5      Protein, Urine NEGATIVE      Glucose, Urine Normal      Blood, Urine 1.0 (3+) (*)     Ketones, Urine NEGATIVE      Bilirubin, Urine NEGATIVE    "   Urobilinogen, Urine 2 (1+) (*)     Nitrite, Urine NEGATIVE      Leukocyte Esterase, Urine NEGATIVE     URINALYSIS MICROSCOPIC WITH REFLEX CULTURE - Abnormal    WBC, Urine 1-5      RBC, Urine >20 (*)     Squamous Epithelial Cells, Urine 1-9 (SPARSE)      Budding Yeast, Urine PRESENT (*)     Mucus, Urine FEW     COMPREHENSIVE METABOLIC PANEL - Normal    Glucose 85      Sodium 138      Potassium 4.4      Chloride 105      Bicarbonate 24      Anion Gap 13      Urea Nitrogen 8      Creatinine 0.73      eGFR >90      Calcium 9.3      Albumin 3.8      Alkaline Phosphatase 83      Total Protein 6.8      AST 12      Bilirubin, Total 0.3      ALT 8     LIPASE - Normal    Lipase 30      Narrative:     Venipuncture immediately after or during the administration of Metamizole may lead to falsely low results. Testing should be performed immediately prior to Metamizole dosing.   POCT PREGNANCY, URINE - Normal    Preg Test, Ur Negative     URINALYSIS WITH REFLEX CULTURE AND MICROSCOPIC    Narrative:     The following orders were created for panel order Urinalysis with Reflex Culture and Microscopic.  Procedure                               Abnormality         Status                     ---------                               -----------         ------                     Urinalysis with Reflex C...[[id number]]  Abnormal            Final result               Extra Urine Gray Tube[[id number]]                            Final result                 Please view results for these tests on the individual orders.   EXTRA URINE GRAY TUBE    Extra Tube 293       Radiographs:  CT abdomen pelvis w IV contrast           ED Course   Visit Vitals  BP (!) 149/98 (BP Location: Left arm, Patient Position: Sitting)   Pulse 92   Temp 36.2 °C (97.2 °F) (Temporal)   Resp 17   Ht 1.6 m (5' 3\")   Wt 83.9 kg (185 lb)   SpO2 97%   BMI 32.77 kg/m²   Smoking Status Every Day   BSA 1.93 m²     Medications   orphenadrine (Norflex) injection 60 mg (60 mg " intravenous Given 5/6/25 1537)   iohexol (OMNIPaque) 350 mg iodine/mL solution 75 mL (80 mL intravenous Given 5/6/25 1925)       Medical Decision Making   UA with 3+ blood, greater than 20 RBCs, normal WBCs -not infected.  WBC slightly elevated without immature granulocytosis.  Normal CMP lipase.  Negative urine pregnancy test.  CT of the abdomen and pelvis negative for acute intra-abdominal pathology.  Suspect patient likely has small renal stone causing her symptoms giving focal left mid abdominal pain with hematuria. Meds sent to [facility] pharmacy. Referred to urology for followup.      Final Impression      1. Kidney stone          DISPOSITION  Disposition: discharge  Patient condition is: Stable    Comment: Please note this report has been produced using speech recognition software and may contain errors related to that system including errors in grammar, punctuation, and spelling, as well as words and phrases that may be inappropriate.  If there are any questions or concerns please feel free to contact the dictating provider for clarification.    Katarzyna Cisneros PA-C         [1]   Past Medical History:  Diagnosis Date    Other conditions influencing health status     H/O pregnancy    Personal history of other endocrine, nutritional and metabolic disease     History of obesity    Personal history of other mental and behavioral disorders     History of depression    Personal history of other specified conditions     History of nausea    Personal history of other specified conditions     History of vomiting    Type A blood, Rh positive     Blood type A+   [2]   Past Surgical History:  Procedure Laterality Date    TUBAL LIGATION  03/20/2014    Tubal Ligation   [3]   Social History  Socioeconomic History    Marital status: Single   Tobacco Use    Smoking status: Every Day     Current packs/day: 0.50     Types: Cigarettes     Passive exposure: Never    Smokeless tobacco: Never   Vaping Use    Vaping status:  Never Used   Substance and Sexual Activity    Alcohol use: Not Currently    Drug use: Never    Sexual activity: Defer     Social Drivers of Health     Financial Resource Strain: Not on File (2022)    Received from [facility]    Financial Resource Strain     Financial Resource Strain: 0   Food Insecurity: Not on File (2022)    Received from [facility]    Food Insecurity     Food: 0   Transportation Needs: Not on File (2022)    Received from [facility]    Transportation Needs     Transportation: 0   Physical Activity: Not on File (2022)    Received from [facility]    Physical Activity     Physical Activity: 0   Stress: Not on File (2022)    Received from [facility]    Stress     Stress: 0   Social Connections: Not on File (2022)    Received from [facility]    Social Connections     Connectedness: 0   Housing Stability: Not on File (2022)    Received from [facility]    Housing Stability     Housin   [4] No Known Allergies       Katarzyna Cisneros PA-C  25

## 2025-05-06 NOTE — ED TRIAGE NOTES
Pt to ED with c/o L side pain x 1 year. Pt states pain has been on and off. Starts to L abdomen and goes up to L breast. Pt was seen for this before and all was normal. Denies painful urination. Denies other pmh.

## 2025-05-06 NOTE — Clinical Note
Colette Mena was seen and treated in our emergency department on 5/6/2025.  She may return to work on 05/08/2025.       If you have any questions or concerns, please don't hesitate to call.      Katarzyna Cisneros PA-C

## 2025-05-07 NOTE — DISCHARGE INSTRUCTIONS
Labs are normal - urine shows blood, likely from a kidney stone.  Take pain medication as needed. Take flomax once daily to encourage passage of stone.  Follow up with urology.